# Patient Record
Sex: MALE | Race: WHITE | NOT HISPANIC OR LATINO | Employment: OTHER | URBAN - METROPOLITAN AREA
[De-identification: names, ages, dates, MRNs, and addresses within clinical notes are randomized per-mention and may not be internally consistent; named-entity substitution may affect disease eponyms.]

---

## 2024-01-26 ENCOUNTER — HOSPITAL ENCOUNTER (OUTPATIENT)
Dept: CT IMAGING | Facility: HOSPITAL | Age: 64
Discharge: HOME | End: 2024-01-26
Attending: INTERNAL MEDICINE
Payer: COMMERCIAL

## 2024-01-26 DIAGNOSIS — K22.89 ESOPHAGEAL MASS: ICD-10-CM

## 2024-01-26 PROCEDURE — 71260 CT THORAX DX C+: CPT

## 2024-01-26 PROCEDURE — 250N000011 HC RX IP 250 OP 636: Performed by: INTERNAL MEDICINE

## 2024-01-26 RX ORDER — IOPAMIDOL 755 MG/ML
90 INJECTION, SOLUTION INTRAVASCULAR ONCE
Status: COMPLETED | OUTPATIENT
Start: 2024-01-26 | End: 2024-01-26

## 2024-01-26 RX ADMIN — IOPAMIDOL 90 ML: 755 INJECTION, SOLUTION INTRAVENOUS at 16:11

## 2024-02-13 ENCOUNTER — TRANSCRIBE ORDERS (OUTPATIENT)
Dept: OTHER | Age: 64
End: 2024-02-13

## 2024-02-13 DIAGNOSIS — C15.5 MALIGNANT NEOPLASM OF LOWER THIRD OF ESOPHAGUS (H): Primary | ICD-10-CM

## 2024-02-14 ENCOUNTER — PATIENT OUTREACH (OUTPATIENT)
Dept: SURGERY | Facility: CLINIC | Age: 64
End: 2024-02-14

## 2024-02-14 NOTE — TELEPHONE ENCOUNTER
Action February 16, 2024 11:53 AM ABT   Action Taken Slides from [facility] received and taken to 5th floor path lab for review.    02/06/24: [id number]      Action February 20, 2024 1:29 PM    Action Taken - Called [facility] to push PET image to  PACS. They will push.     RECORDS STATUS - ALL OTHER DIAGNOSIS      RECORDS RECEIVED FROM: /[facility]   DATE RECEIVED:    NOTES STATUS DETAILS   OFFICE NOTE from referring provider [facility]- [facility] 2/7/24: Dr. Angelito Puckett   OFFICE NOTE from other specialist [facility]- [facility] 2/12/24: Dr. Holly Monique   OPERATIVE REPORT [facility] 2/6/24: Upper EGD   MEDICATION LIST CE-     LABS     PATHOLOGY REPORTS Report in [facility]  (Slides Requested)  [employer] [employer] Tracking #: [id number] 2/6/24: [id number] (positive)   ANYTHING RELATED TO DIAGNOSIS [facility]- [facility]  Most recent 2/9/24   IMAGING (NEED IMAGES & REPORT)     CT SCANS PACS 1/26/24: CT Chest Abd Pel   PET (Img req from ) 2/20/24: PET Skull to Thighs

## 2024-02-14 NOTE — PROGRESS NOTES
New Patient Oncology Nurse Navigator Note     Referring provider: COURTNEY Saenz Onc    Referring Clinic/Organization: [facility]      Referred to: Thoracic Surgery    Requested provider (if applicable): First available - did not specify     Referral Received: 02/13/24       Evaluation for : esophageal cancer     Clinical History (per Nurse review of records provided):      (See my bookmarks for pertinent records in Epic)      HX 1/26/2024 [facility] EGD + esophageal mod diff adenocarcinoma (need this EGD report & path from [facility]). 2/6 EUS at [facility] confirms cT3 N1 disease. Pt met w/ [facility] Onc Dr Puckett 2/7, plan for neoadj chemo, port. Pt met w/ Rad Onc [facility] Dr Monique on 2/12, plan for sim 2/22. PET planned 2/20.    Clinical Assessment / Barriers to Care (Per Nurse):    Pt referred to Thoracic Surgery to discuss resectability. Prefer appt after PET 2/20; Dr Lilly has availability at [facility] location 2/21/2024. Will offer appt to pt, or schedule per pt preference.    Records Location: Care Everywhere   Faxed - Media tab/Scanned     Records Needed:     Outside imaging, path    Additional testing needed prior to consult:     PET 2/20/24 at         Dionisio Welch, RN, BSN, OCN  Oncology New Patient Nurse Navigator   [facility]  [phone]

## 2024-02-19 ENCOUNTER — LAB REQUISITION (OUTPATIENT)
Dept: LAB | Facility: CLINIC | Age: 64
End: 2024-02-19
Payer: COMMERCIAL

## 2024-02-19 PROCEDURE — 88321 CONSLTJ&REPRT SLD PREP ELSWR: CPT | Mod: GC | Performed by: PATHOLOGY

## 2024-02-20 LAB
PATH REPORT.COMMENTS IMP SPEC: ABNORMAL
PATH REPORT.COMMENTS IMP SPEC: YES
PATH REPORT.FINAL DX SPEC: ABNORMAL
PATH REPORT.GROSS SPEC: ABNORMAL
PATH REPORT.MICROSCOPIC SPEC OTHER STN: ABNORMAL
PATH REPORT.RELEVANT HX SPEC: ABNORMAL
PATH REPORT.RELEVANT HX SPEC: ABNORMAL
PATH REPORT.SITE OF ORIGIN SPEC: ABNORMAL

## 2024-02-20 NOTE — PROGRESS NOTES
"THORACIC SURGERY - NEW PATIENT OFFICE VISIT      Dear Dr. Longoria,    I saw Kalia Contreras at Dr. Puckett s request in consultation for the evaluation and treatment of an adenocarcinoma of the distal esophagus.     HPI  Kalia Contreras is a 63 year old male who started with early satiety about 3 months ago and dysphagia about 6 weeks ago, with unintentional weight loss of ~15 lbs.    Previsit Tests   CT chest/abd/pelvis 1/26/2024  Enlarged necrotic LN adjacent to distal esophageal thickening, enlarged GH LN, dilated esophagus with debris, hepatic steatosis and fibrosis. No evidence of metastatic disease.    EGD/EUS 2/6/2024: Circumferential mass at 35 cm from the incisors, uT3N1, FNA of a paraesophageal LN away from tumor  Cytology 2/6/2024, reviewed at [facility]: Adenocarcinoma  PET CT 2/20/2024: No obvious metastatic disease based on my review (no report available)        PMH  HTN  Hypercholesterolemia  Gout  GERD  Obesity      PSH  Inguinal hernia repair 1970s  RIGHT hip replacement 2019    ETOH 1 drink 2x/month  TOB 20 years 1.5 ppd ~50 pack years, quit 1999    Current Outpatient Medications   Medication    allopurinol (ZYLOPRIM) 100 MG tablet    omeprazole (PRILOSEC) 20 MG DR capsule    ondansetron (ZOFRAN) 8 MG tablet    prochlorperazine (COMPAZINE) 10 MG tablet    acetaminophen (TYLENOL) 325 MG tablet    atorvastatin (LIPITOR) 20 MG tablet    lisinopril (ZESTRIL) 10 MG tablet     No current facility-administered medications for this visit.         Physical examination  /73   Pulse 87   Temp 98.3  F (36.8  C) (Oral)   Ht 1.59 m (5' 2.6\")   Wt 86.1 kg (189 lb 14.4 oz)   SpO2 98%   BMI 34.07 kg/m      No cervical lymphadenopathy  Lungs clear  Heart regular  Abdomen soft  No IE edema    From a personal perspective, he is here with his wife Saranya Hewitt his sister David. He is retired from [employer].      Code Status: Full Code    Health Care Proxy: No advanced directive yet, we gave him " information.    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 I reviewed the plan as follows:    Oncologist: Dr. Angelito Puckett ([facility]), I will discuss with him the timing of a j-tube placement.    Esophagectomy: We went over the procedure in more general terms to give him a clearer picture, and we'll discuss it in detail when he returns after neoadjuvant therapy.    Return to clinic with a new PET-CT 1 month after completion of induction treatment.    I appreciate the opportunity to participate in the care of your patient and will keep you updated.  Sincerely,    Zack Lilly MD

## 2024-02-21 ENCOUNTER — ONCOLOGY VISIT (OUTPATIENT)
Dept: SURGERY | Facility: CLINIC | Age: 64
End: 2024-02-21
Attending: THORACIC SURGERY (CARDIOTHORACIC VASCULAR SURGERY)
Payer: COMMERCIAL

## 2024-02-21 ENCOUNTER — PRE VISIT (OUTPATIENT)
Dept: SURGERY | Facility: CLINIC | Age: 64
End: 2024-02-21
Payer: COMMERCIAL

## 2024-02-21 ENCOUNTER — PATIENT OUTREACH (OUTPATIENT)
Dept: SURGERY | Facility: CLINIC | Age: 64
End: 2024-02-21
Payer: COMMERCIAL

## 2024-02-21 ENCOUNTER — DOCUMENTATION ONLY (OUTPATIENT)
Dept: SURGERY | Facility: CLINIC | Age: 64
End: 2024-02-21
Payer: COMMERCIAL

## 2024-02-21 VITALS
OXYGEN SATURATION: 98 % | HEART RATE: 87 BPM | TEMPERATURE: 98.3 F | HEIGHT: 63 IN | WEIGHT: 189.9 LBS | BODY MASS INDEX: 33.65 KG/M2 | DIASTOLIC BLOOD PRESSURE: 73 MMHG | SYSTOLIC BLOOD PRESSURE: 104 MMHG

## 2024-02-21 DIAGNOSIS — C15.5 MALIGNANT NEOPLASM OF LOWER THIRD OF ESOPHAGUS (H): Primary | ICD-10-CM

## 2024-02-21 PROCEDURE — 99204 OFFICE O/P NEW MOD 45 MIN: CPT | Performed by: THORACIC SURGERY (CARDIOTHORACIC VASCULAR SURGERY)

## 2024-02-21 PROCEDURE — G0463 HOSPITAL OUTPT CLINIC VISIT: HCPCS | Performed by: THORACIC SURGERY (CARDIOTHORACIC VASCULAR SURGERY)

## 2024-02-21 RX ORDER — ONDANSETRON 8 MG/1
8 TABLET, FILM COATED ORAL
COMMUNITY
Start: 2024-02-19

## 2024-02-21 RX ORDER — PROCHLORPERAZINE MALEATE 10 MG
10 TABLET ORAL
COMMUNITY
Start: 2024-02-19

## 2024-02-21 RX ORDER — ACETAMINOPHEN 325 MG/1
TABLET ORAL
COMMUNITY

## 2024-02-21 RX ORDER — ATORVASTATIN CALCIUM 20 MG/1
20 TABLET, FILM COATED ORAL DAILY
COMMUNITY

## 2024-02-21 RX ORDER — ALLOPURINOL 100 MG/1
100 TABLET ORAL
COMMUNITY
Start: 2023-08-24 | End: 2024-08-23

## 2024-02-21 RX ORDER — LISINOPRIL 10 MG/1
10 TABLET ORAL DAILY
COMMUNITY

## 2024-02-21 ASSESSMENT — PAIN SCALES - GENERAL: PAINLEVEL: NO PAIN (0)

## 2024-02-21 NOTE — PROGRESS NOTES
Contacted [facility] to inform patient's oncologist, Dr Angelito Puckett, that Dr Lilly saw patient in clinic today and he would like to discuss patient with her. Message left for Dr Puckett with Dr Lilly's direct callback information.   Writer's direct callback information provided if any questions.    Trupti Leon RN, BSN  Thoracic Surgery RN Care Coordinator

## 2024-02-21 NOTE — NURSING NOTE
"Oncology Rooming Note    February 21, 2024 7:59 AM   Kalia Contreras is a 63 year old male who presents for:    Chief Complaint   Patient presents with    Oncology Clinic Visit     New Eval for Malignant Neoplasm of Esophagus     Initial Vitals: /73   Pulse 87   Temp 98.3  F (36.8  C) (Oral)   Ht 1.59 m (5' 2.6\")   Wt 86.1 kg (189 lb 14.4 oz)   SpO2 98%   BMI 34.07 kg/m   Estimated body mass index is 34.07 kg/m  as calculated from the following:    Height as of this encounter: 1.59 m (5' 2.6\").    Weight as of this encounter: 86.1 kg (189 lb 14.4 oz). Body surface area is 1.95 meters squared.  No Pain (0) Comment: Data Unavailable   No LMP for male patient.  Allergies reviewed: Yes  Medications reviewed: Yes    Medications: Medication refills not needed today.  Pharmacy name entered into [email or web address]: [facility] [address] [place] [address]    Frailty Screening:   Is the patient here for a new oncology consult visit in cancer care? 2. No      Clinical concerns: none       Lisa Keller MA             "

## 2024-02-21 NOTE — LETTER
"    2/21/2024         RE: Kalia Contreras  [address] [place] [address]        Dear Colleague,    Thank you for referring your patient, Kalia Contreras, to the [facility]. Please see a copy of my visit note below.    THORACIC SURGERY - NEW PATIENT OFFICE VISIT      Dear Dr. Longoria,    I saw Kalia Contreras at Dr. Puckett s request in consultation for the evaluation and treatment of an adenocarcinoma of the distal esophagus.     HPI  Kalia Contreras is a 63 year old male who started with early satiety about 3 months ago and dysphagia about 6 weeks ago, with unintentional weight loss of ~15 lbs.    Previsit Tests   CT chest/abd/pelvis 1/26/2024  Enlarged necrotic LN adjacent to distal esophageal thickening, enlarged GH LN, dilated esophagus with debris, hepatic steatosis and fibrosis. No evidence of metastatic disease.    EGD/EUS 2/6/2024: Circumferential mass at 35 cm from the incisors, uT3N1, FNA of a paraesophageal LN away from tumor  Cytology 2/6/2024, reviewed at [facility]: Adenocarcinoma  PET CT 2/20/2024: No obvious metastatic disease based on my review (no report available)        PMH  HTN  Hypercholesterolemia  Gout  GERD  Obesity      PSH  Inguinal hernia repair 1970s  RIGHT hip replacement 2019    ETOH 1 drink 2x/month  TOB 20 years 1.5 ppd ~50 pack years, quit 1999    Current Outpatient Medications   Medication    allopurinol (ZYLOPRIM) 100 MG tablet    omeprazole (PRILOSEC) 20 MG DR capsule    ondansetron (ZOFRAN) 8 MG tablet    prochlorperazine (COMPAZINE) 10 MG tablet    acetaminophen (TYLENOL) 325 MG tablet    atorvastatin (LIPITOR) 20 MG tablet    lisinopril (ZESTRIL) 10 MG tablet     No current facility-administered medications for this visit.         Physical examination  /73   Pulse 87   Temp 98.3  F (36.8  C) (Oral)   Ht 1.59 m (5' 2.6\")   Wt 86.1 kg (189 lb 14.4 oz)   SpO2 98%   BMI 34.07 kg/m      No cervical lymphadenopathy  Lungs " clear  Heart regular  Abdomen soft  No IE edema    From a personal perspective, he is here with his wife Saranya Hewitt his sister David. He is retired from [employer] maintenance.      Code Status: Full Code    Health Care Proxy: No advanced directive yet, we gave him information.    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 I reviewed the plan as follows:    Oncologist: Dr. Angelito Puckett ([facility]), I will discuss with him the timing of a j-tube placement.    Esophagectomy: We went over the procedure in more general terms to give him a clearer picture, and we'll discuss it in detail when he returns after neoadjuvant therapy.    Return to clinic with a new PET-CT 1 month after completion of induction treatment.    I appreciate the opportunity to participate in the care of your patient and will keep you updated.  Sincerely,    Zack Lilly MD

## 2024-02-21 NOTE — TELEPHONE ENCOUNTER
[facility]: Cancer Care Initial Note                                    Discussion with Patient:                                                      Met with patient immediately after patient's clinic visit with Dr Lilly. Introduced self and role as Nurse Coordinator with Thoracic Surgery.     Reviewed how to prepare Advanced Health Care Directives with patient. Provided patient with Health Care Directives forms and instructions.  Patient verbalized he will review forms at home.      Intervention/Education provided during outreach:                                                       Patient with no questions at this time.  Provided patient with writer's business card with direct contact information.      Signature:  Trupti Leon RN

## 2024-02-22 ENCOUNTER — DOCUMENTATION ONLY (OUTPATIENT)
Dept: SURGERY | Facility: CLINIC | Age: 64
End: 2024-02-22
Payer: COMMERCIAL

## 2024-02-22 NOTE — PROGRESS NOTES
Epic message received at 2:06pm on 2/21/2024 from Zack Lilly MD Smelter, Tammi, RN  Return to clinic with new PET 1 month after treatment.  Dale Puckett and I have talked.  Thank you

## 2024-03-10 ENCOUNTER — HEALTH MAINTENANCE LETTER (OUTPATIENT)
Age: 64
End: 2024-03-10

## 2024-04-05 ENCOUNTER — DOCUMENTATION ONLY (OUTPATIENT)
Dept: SURGERY | Facility: CLINIC | Age: 64
End: 2024-04-05
Payer: COMMERCIAL

## 2024-04-05 DIAGNOSIS — C15.5 MALIGNANT NEOPLASM OF LOWER THIRD OF ESOPHAGUS (H): Primary | ICD-10-CM

## 2024-04-05 NOTE — PROGRESS NOTES
Writer spoke with [facility]. Patient's last day of neoadjuvant therapy was today 4/5/2024.   Will get patient scheduled for restaging PET/CT and clinic follow up with Dr Lilly in 4 weeks per plan at last clinic visit.     Trupti Leon RN, BSN  Thoracic Surgery RN Care Coordinator

## 2024-04-09 ENCOUNTER — TRANSCRIBE ORDERS (OUTPATIENT)
Dept: OTHER | Age: 64
End: 2024-04-09

## 2024-04-09 DIAGNOSIS — C15.5 MALIGNANT NEOPLASM OF LOWER THIRD OF ESOPHAGUS (H): Primary | ICD-10-CM

## 2024-04-17 ENCOUNTER — TELEPHONE (OUTPATIENT)
Dept: PULMONOLOGY | Facility: CLINIC | Age: 64
End: 2024-04-17
Payer: COMMERCIAL

## 2024-05-06 ENCOUNTER — HOSPITAL ENCOUNTER (OUTPATIENT)
Dept: PET IMAGING | Facility: HOSPITAL | Age: 64
Discharge: HOME | End: 2024-05-06
Attending: CLINICAL NURSE SPECIALIST
Payer: COMMERCIAL

## 2024-05-06 DIAGNOSIS — C15.5 MALIGNANT NEOPLASM OF LOWER THIRD OF ESOPHAGUS (H): ICD-10-CM

## 2024-05-06 LAB — GLUCOSE BLDC GLUCOMTR-MCNC: 76 MG/DL (ref 70–99)

## 2024-05-06 PROCEDURE — 78815 PET IMAGE W/CT SKULL-THIGH: CPT | Mod: PS

## 2024-05-06 PROCEDURE — 343N000001 HC RX 343: Performed by: CLINICAL NURSE SPECIALIST

## 2024-05-06 PROCEDURE — 82962 GLUCOSE BLOOD TEST: CPT

## 2024-05-06 PROCEDURE — 71260 CT THORAX DX C+: CPT

## 2024-05-06 PROCEDURE — A9552 F18 FDG: HCPCS | Performed by: CLINICAL NURSE SPECIALIST

## 2024-05-06 PROCEDURE — 250N000011 HC RX IP 250 OP 636: Performed by: CLINICAL NURSE SPECIALIST

## 2024-05-06 RX ORDER — IOPAMIDOL 755 MG/ML
93 INJECTION, SOLUTION INTRAVASCULAR ONCE
Status: COMPLETED | OUTPATIENT
Start: 2024-05-06 | End: 2024-05-06

## 2024-05-06 RX ORDER — FLUDEOXYGLUCOSE F 18 200 MCI/ML
9-18 INJECTION, SOLUTION INTRAVENOUS ONCE
Status: COMPLETED | OUTPATIENT
Start: 2024-05-06 | End: 2024-05-06

## 2024-05-06 RX ADMIN — IOPAMIDOL 93 ML: 755 INJECTION, SOLUTION INTRAVENOUS at 09:28

## 2024-05-06 RX ADMIN — FLUDEOXYGLUCOSE F 18 10.71 MILLICURIE: 200 INJECTION, SOLUTION INTRAVENOUS at 08:32

## 2024-05-09 ENCOUNTER — HOSPITAL ENCOUNTER (OUTPATIENT)
Dept: GENERAL RADIOLOGY | Facility: CLINIC | Age: 64
Discharge: HOME | End: 2024-05-09
Attending: CLINICAL NURSE SPECIALIST
Payer: COMMERCIAL

## 2024-05-09 DIAGNOSIS — C15.5 MALIGNANT NEOPLASM OF LOWER THIRD OF ESOPHAGUS (H): ICD-10-CM

## 2024-05-09 DIAGNOSIS — C15.5 MALIGNANT NEOPLASM OF LOWER THIRD OF ESOPHAGUS (H): Primary | ICD-10-CM

## 2024-05-09 PROCEDURE — 999N000122 PET MHEALTH OVERREAD

## 2024-05-09 PROCEDURE — 78815 PET IMAGE W/CT SKULL-THIGH: CPT | Mod: 26 | Performed by: RADIOLOGY

## 2024-05-12 ENCOUNTER — DOCUMENTATION ONLY (OUTPATIENT)
Dept: SURGERY | Facility: CLINIC | Age: 64
End: 2024-05-12
Payer: COMMERCIAL

## 2024-05-12 ENCOUNTER — PREP FOR PROCEDURE (OUTPATIENT)
Dept: SURGERY | Facility: CLINIC | Age: 64
End: 2024-05-12
Payer: COMMERCIAL

## 2024-05-12 DIAGNOSIS — C15.5 MALIGNANT NEOPLASM OF LOWER THIRD OF ESOPHAGUS (H): Primary | ICD-10-CM

## 2024-05-12 RX ORDER — ENOXAPARIN SODIUM 100 MG/ML
40 INJECTION SUBCUTANEOUS
Status: CANCELLED | OUTPATIENT
Start: 2024-05-12

## 2024-05-12 RX ORDER — ACETAMINOPHEN 325 MG/1
975 TABLET ORAL ONCE
Status: CANCELLED | OUTPATIENT
Start: 2024-05-12 | End: 2024-05-12

## 2024-05-12 NOTE — PROGRESS NOTES
THORACIC SURGERY UPDATE    Mr. Contreras's new PET-CT from 5/6 shows a NEW PET-avid 2R LN and to my review he has a growing RIGHT sacral bone lesion with a matching lytic component on the CT.     5/6/2024 NEW 2R LN    PRE-TREATMENT            5/6/2024 PET: Growing RIGHT sacral bone lesion  PRE-TREATMENT PET: sacral uptake (NOT reported)        5/6/2024 Noticeable RIGHT sacral lytic lesion    PRE-TREATMENT Possible tiny sacral lesion          PLAN:  We will discuss his case at tumor board. If there is any question about the sacral lesion, then a biopsy may be indicated.

## 2024-05-13 ENCOUNTER — DOCUMENTATION ONLY (OUTPATIENT)
Dept: SURGERY | Facility: CLINIC | Age: 64
End: 2024-05-13
Payer: COMMERCIAL

## 2024-05-13 NOTE — PROGRESS NOTES
Patient called requesting a call back from doctor or clinical staff  @ [phone] The new PET report is consistent with metastases.    I informed Mr. Contreras and his oncologist, Dr. Puckett. Dr. Puckett will continue his care.

## 2025-03-16 ENCOUNTER — HEALTH MAINTENANCE LETTER (OUTPATIENT)
Age: 65
End: 2025-03-16